# Patient Record
Sex: FEMALE | Race: WHITE | NOT HISPANIC OR LATINO | URBAN - METROPOLITAN AREA
[De-identification: names, ages, dates, MRNs, and addresses within clinical notes are randomized per-mention and may not be internally consistent; named-entity substitution may affect disease eponyms.]

---

## 2018-02-01 ENCOUNTER — OFFICE VISIT (OUTPATIENT)
Dept: FAMILY MEDICINE | Facility: CLINIC | Age: 28
End: 2018-02-01
Payer: COMMERCIAL

## 2018-02-01 VITALS
DIASTOLIC BLOOD PRESSURE: 64 MMHG | BODY MASS INDEX: 19.16 KG/M2 | TEMPERATURE: 98 F | SYSTOLIC BLOOD PRESSURE: 101 MMHG | HEART RATE: 76 BPM | WEIGHT: 119.25 LBS | HEIGHT: 66 IN

## 2018-02-01 DIAGNOSIS — Z00.00 ANNUAL PHYSICAL EXAM: Primary | ICD-10-CM

## 2018-02-01 DIAGNOSIS — R41.840 DIFFICULTY CONCENTRATING: ICD-10-CM

## 2018-02-01 PROCEDURE — 99999 PR PBB SHADOW E&M-NEW PATIENT-LVL III: CPT | Mod: PBBFAC,,, | Performed by: INTERNAL MEDICINE

## 2018-02-01 PROCEDURE — 99385 PREV VISIT NEW AGE 18-39: CPT | Mod: S$GLB,,, | Performed by: INTERNAL MEDICINE

## 2018-02-01 RX ORDER — NORGESTREL AND ETHINYL ESTRADIOL 0.3-0.03MG
1 KIT ORAL DAILY
Refills: 10 | COMMUNITY
Start: 2017-12-27

## 2018-02-01 NOTE — PATIENT INSTRUCTIONS
PSYCHIATRIST RECOMMENDATIONS:    [facility] [phone]  - Dr Benedict, Dr Currie, Dr Raquel Solomon, [facility],  [address] [facility] [address] , [place] ,[place] [address], [phone]  Dr Daphne Glinmeyer Dr Ted Bloch, [place] , [address], [phone]  Dr Claudy Blackman, [address] [facility], [phone]  Dr Anne Davis, [address] [place] [address], [phone]   [facility],  [address], [place] [address], [phone]  Dr Aida Bello [phone], [address], [place]    ADD evaluation -   Angelia Austin , PhD - [phone], [address];        Dr Candelaria , [address] , [address] in [facility], [phone]  Dr Meeta Mares , clinical psychologist. [phone], [address], [address], [email or web address]    THERAPIST RECOMMENDATIONS:    [facility] ([address]) $30/ session  Iggy Cr , [address] [facility] [place] [phone] ( HOLISTIC, good with teenagers)  Moriah Trimble - [phone],  [address] [facility] with Pelts (buccal mucosa swab)  [facility]  Raiza Barnes, PhD - family & play therapist, [phone]  Marshall Tobar, Full time at [employer], marriage & family counsellor  Justin Bryant - couples therapist  Holly Ariza - [facility]  Dante Dunn on [address] on [address]  Domi Romantre  [address]  [facility] - Paz Ann, Darlin Mariee,  Bronwyn Candelario, Melissa Harkins [phone]  Oly Garner - [phone]  Anahy Bryan (ayurvedic) - [phone] (focus on families, trauma)  Lynnette Gomez [address] [facility], [phone]  Matt Burns [phone] (focus on addiction), [address] [place] [address]  [facility] - Dominic Alanna [phone](focus on addiction)  Adrianna Hernandez [phone]  Verónica Singleton [phone]  Iggy Cr , [address] [facility] [phone] ( HOLISTIC, good with teenagers)  [facility], [phone]  Duyen Latif (sexual relationship therapist),  [address] [facility] [address] [address], [phone]  Christina Espinoza

## 2018-02-01 NOTE — PROGRESS NOTES
Subjective:        Patient ID: Bronwyn Castellanos is a 27 y.o. female.    Chief Complaint: Establish Care    HPI   Bronwyn Castellanos presents for annual exam.    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  She is supposed to take Step 3 by the end of this year but having difficulty finding time to study due to fluctuating work schedule, long hours and when she does sit down to study, is having a hard time focusing.  Pt never evaluated for ADD or ADHD in the past.  Pt has tried Adderall and some other medications in the past and found that adderall 20mg did help her focus; no adverse effects noted.    Review of Systems   Constitutional: Negative for fatigue and unexpected weight change.   HENT: Negative for ear pain, hearing loss, sore throat and trouble swallowing.    Eyes: Negative for visual disturbance (contact lenses UTD).   Respiratory: Negative for chest tightness and shortness of breath.    Cardiovascular: Negative for chest pain and leg swelling.   Gastrointestinal: Negative for abdominal pain, blood in stool, constipation and diarrhea.   Genitourinary: Negative for difficulty urinating, hematuria, menstrual problem and vaginal discharge.   Musculoskeletal: Negative for arthralgias and back pain.   Skin: Negative for rash.   Neurological: Negative for dizziness and headaches.   Hematological: Does not bruise/bleed easily.   Psychiatric/Behavioral: Negative for dysphoric mood and sleep disturbance. The patient is not nervous/anxious.            Objective:        Vitals:    02/01/18 1307   BP: 101/64   Pulse: 76   Temp: 97.5 °F (36.4 °C)     Physical Exam   Constitutional: She is oriented to person, place, and time. She appears well-developed and well-nourished. No distress.   HENT:   Head: Normocephalic and atraumatic.   Right Ear: External ear normal.   Left Ear: External ear normal.   Nose: Nose normal.   - bilateral ear canals clear, tympanic membranes visualized - normal color and light reflex    Eyes: Conjunctivae and EOM are normal.   Cardiovascular: Normal rate, regular rhythm and normal heart sounds.    No murmur heard.  Pulmonary/Chest: Effort normal and breath sounds normal. No respiratory distress.   Musculoskeletal: She exhibits no edema.   Neurological: She is alert and oriented to person, place, and time.   Skin: Skin is warm and dry.   Psychiatric: Her behavior is normal.   Vitals reviewed.          Assessment:         1. Annual physical exam    2. Difficulty concentrating              Plan:         Bronwyn was seen today for establish care.    Diagnoses and all orders for this visit:    Annual physical exam  - vaccines up to date  - no indication for routine lab work at this time    Difficulty concentrating: Reviewed clinic policy for prescribing ADD/ADHD medications.  Referral to psychology for evaluation; advised pt she can also get independent eval and have results forwarded to me.    -     Ambulatory referral to Psychology        Follow-up in about 1 year (around 2/1/2019) for annual exam or sooner as needed.    Patient Instructions   PSYCHIATRIST RECOMMENDATIONS:    [facility] [phone]  - Dr Benedict, Dr Currie, Dr Raquel Solomon, [facility],  [address] [facility] [address], [address] , [place] [address], [phone]  Dr Daphne Glinmeyer Dr Ted Bloch, [place] , [address] [facility], [phone]  Dr Claudy Blackman, [address] [facility], [address], [phone]  Dr Anne Davis, [address] [place] [address], [phone]   [facility],  [address] [facility] [address], [phone]  Dr Aida Bello [phone], [address], [place]    ADD evaluation -   Angelia Austin , PhD - [phone], [address];        Dr Candelaria , [address] , [address] in [facility], [phone]  Dr Meeta Mares , clinical psychologist. [phone], [address], [address], [email or web address]    THERAPIST RECOMMENDATIONS:    [facility] ([address]) $30/  session  Iggy Cr , [address] [place] [address] [phone] ( HOLISTIC, good with teenagers)  Moriah Trimble - [phone],  [facility] with Pelts (buccal mucosa swab)  Verónicaaudra Swift - [facility]  Raiza Barnes, PhD - family & play therapist, [phone]  Marshall Tobar, Full time at [employer], marriage & family counsellor  Justin Bryant - couples therapist  Holly Ariza - [facility]  Dante Dunn on [address]  Domi Clou[address]  [address]  [facility] - Paz Ann, Darlin Mariee,  Bronwyn Candelario, Melissa Harkins [phone]  Oly Garner - [phone]  Anahy Bryan (ayurvedic) - [phone] (focus on families, trauma)  Lynnette Gomez [address] [facility], [phone]  Matt Burns [phone] (focus on addiction), [address] [place] [address]  [facility] - Dominic Alanna [phone](focus on addiction)  Adrianna Hernandez [phone]  Verónica Singleton [phone]  Iggy Cr , [address] [place] [address] [phone] ( HOLISTIC, good with teenagers)  [facility], [phone]  Duyen Latif (sexual relationship therapist), [address] [facility], [place] [address], [phone]  Christina Espinoza